# Patient Record
(demographics unavailable — no encounter records)

---

## 2025-03-07 NOTE — PHYSICAL EXAM
[Alert] : alert [Acute Distress] : no acute distress [Normocephalic] : normocephalic [Flat Open Anterior [place]] : flat open anterior fontanelle [Icteric sclera] : nonicteric sclera [PERRL] : PERRL [Red Reflex Bilateral] : red reflex bilateral [Normally Placed Ears] : normally placed ears [Auricles Well Formed] : auricles well formed [Discharge] : no discharge [Nares Patent] : nares patent [Palate Intact] : palate intact [Uvula Midline] : uvula midline [Supple, full passive range of motion] : supple, full passive range of motion [Palpable Masses] : no palpable masses [Symmetric Chest Rise] : symmetric chest rise [Clear to Auscultation Bilaterally] : clear to auscultation bilaterally [Regular Rate and Rhythm] : regular rate and rhythm [S1, S2 present] : S1, S2 present [Murmurs] : no murmurs [+2 Femoral Pulses] : +2 femoral pulses [Soft] : soft [Tender] : nontender [Distended] : not distended [Bowel Sounds] : bowel sounds present [Umbilical Stump Dry, Clean, Intact] : umbilical stump dry, clean, intact [Hepatomegaly] : no hepatomegaly [Splenomegaly] : no splenomegaly [Normal external genitalia] : normal external genitalia [Clitoromegaly] : no clitoromegaly [Patent Vagina] : patent vagina [Patent] : patent [Normally Placed] : normally placed [No Abnormal Lymph Nodes Palpated] : no abnormal lymph nodes palpated [Chowdhury-Ortolani] : negative Chowdhury-Ortolani [Symmetric Flexed Extremities] : symmetric flexed extremities [Spinal Dimple] : no spinal dimple [Tuft of Hair] : no tuft of hair [Startle Reflex] : startle reflex present [Suck Reflex] : suck reflex present [Rooting] : rooting reflex present [Palmar Grasp] : palmar grasp present [Plantar Grasp] : plantar reflex present [Symmetric Live] : symmetric [place] [Jaundice] : not jaundice [Dermal Melanocytosis] : Dermal Melanocytosis [de-identified] : 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

## 2025-03-07 NOTE — DISCUSSION/SUMMARY
[ Transition] :  transition [ Care] :  care [Nutritional Adequacy] : nutritional adequacy [Parental Well-Being] : parental well-being [Safety] : safety [Hepatitis B In Hospital] : Hepatitis B not administered while in the hospital [Parental Concerns Addressed] : Parental concerns addressed [] : The components of the vaccine(s) to be administered today are listed in the plan of care. The disease(s) for which the vaccine(s) are intended to prevent and the risks have been discussed with the caretaker.  The risks are also included in the appropriate vaccination information statements which have been provided to the patient's caregiver.  The caregiver has given consent to vaccinate. [FreeTextEntry1] : 3 day old  here for initial well visit. Feeding breast and formula. Overall doing well. Unremarkable pregnancy and nursery course.  Jaundice mild with TcB of 9.2. Rate of rise 0.1 mg/dL/hr, and phototherapy threshold for danika+ is 17.1. Gained weight since discharge.   - continue ad marielena feeds, encouraged breast feeding. Start vitamin D. - continue monitoring elimination, return for <4 voids per 24hrs for concern for dehydration - return for stools that are hard or colored gray, black or red - continue safe sleep practice, encourage separate sleeping space and back -to-sleep - increase tummy time to few times per day when awake, once umbilical cord is off - Hepatitis B vaccine given today. Declines beyfortus.    - RTC within one week for weight and bilirubin check. Educated on signs/symptoms of dehydration and hyperbilirubinemia, to return sooner for any concerns.

## 2025-03-07 NOTE — HISTORY OF PRESENT ILLNESS
[In Bassinet/Crib] : sleeps in bassinet/crib [Born at ___ Wks Gestation] : The patient was born at [unfilled] weeks gestation [C/S] : via  section [C/S Indication: ____] : ( [unfilled] ) [[facility]] : at [facility] [(1) _____] : [unfilled] [(5) _____] : [unfilled] [BW: _____] : weight of [unfilled] [Length: _____] : length of [unfilled] [DW: _____] : Discharge weight was [unfilled] [Age: ___] : [unfilled] year old mother [G: ___] : G [unfilled] [P: ___] : P [unfilled] [Rubella (Immune)] : Rubella immune [MBT: ____] : MBT - [unfilled] [] : positive [RSV vaccine] : RSV vaccine not received by mother at least 14 days prior to delivery [HepBsAG] : HepBsAg negative [HIV] : HIV negative [HepC] : Hepatitis C negative [GBS] : GBS negative [VDRL/RPR (Reactive)] : VDRL/RPR nonreactive [TcB: _____] : Transcutaneous Bilirubin [unfilled] mg/dL [Yes] : Yes [FreeTextEntry9] : B+ [de-identified] : 51 [Breast milk] : breast milk [Formula ___ oz/feed] : [unfilled] oz of formula per feed [Normal] : Normal [Hepatitis B Vaccine Given] : Hepatitis B vaccine not given [Nirsevimab Given] : Nirsevimab not given

## 2025-03-07 NOTE — PHYSICAL EXAM
[Alert] : alert [Acute Distress] : no acute distress [Normocephalic] : normocephalic [Flat Open Anterior [place]] : flat open anterior fontanelle [Icteric sclera] : nonicteric sclera [PERRL] : PERRL [Red Reflex Bilateral] : red reflex bilateral [Normally Placed Ears] : normally placed ears [Auricles Well Formed] : auricles well formed [Discharge] : no discharge [Nares Patent] : nares patent [Palate Intact] : palate intact [Uvula Midline] : uvula midline [Supple, full passive range of motion] : supple, full passive range of motion [Palpable Masses] : no palpable masses [Symmetric Chest Rise] : symmetric chest rise [Clear to Auscultation Bilaterally] : clear to auscultation bilaterally [Regular Rate and Rhythm] : regular rate and rhythm [S1, S2 present] : S1, S2 present [Murmurs] : no murmurs [+2 Femoral Pulses] : +2 femoral pulses [Soft] : soft [Tender] : nontender [Distended] : not distended [Bowel Sounds] : bowel sounds present [Umbilical Stump Dry, Clean, Intact] : umbilical stump dry, clean, intact [Hepatomegaly] : no hepatomegaly [Splenomegaly] : no splenomegaly [Normal external genitalia] : normal external genitalia [Clitoromegaly] : no clitoromegaly [Patent Vagina] : patent vagina [Patent] : patent [Normally Placed] : normally placed [No Abnormal Lymph Nodes Palpated] : no abnormal lymph nodes palpated [Chowdhury-Ortolani] : negative Chowdhury-Ortolani [Symmetric Flexed Extremities] : symmetric flexed extremities [Spinal Dimple] : no spinal dimple [Tuft of Hair] : no tuft of hair [Startle Reflex] : startle reflex present [Suck Reflex] : suck reflex present [Rooting] : rooting reflex present [Palmar Grasp] : palmar grasp present [Plantar Grasp] : plantar reflex present [Symmetric Live] : symmetric [place] [Jaundice] : not jaundice [Dermal Melanocytosis] : Dermal Melanocytosis [de-identified] : 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

## 2025-03-13 NOTE — DISCUSSION/SUMMARY
[FreeTextEntry1] : 10 day old here for weight check. Back to BW, doing well.  Received abnl  screen results for possible hypothyroidism, TSH of 30.7. Provided mom with lab requisition to have thyroid labs drawn today so we can follow up as necessary. Will call w results.  Next visit at 1 mo [facility].

## 2025-03-13 NOTE — HISTORY OF PRESENT ILLNESS
[de-identified] : Weight check [FreeTextEntry6] : Feeding well breast and bottle many wet and dirty diapers

## 2025-04-03 NOTE — DISCUSSION/SUMMARY
[Parental Well-Being] : parental well-being [Family Adjustment] : family adjustment [Feeding Routines] : feeding routines [Infant Adjustment] : infant adjustment [Safety] : safety [Parental Concerns Addressed] : Parental concerns addressed [] : The components of the vaccine(s) to be administered today are listed in the plan of care. The disease(s) for which the vaccine(s) are intended to prevent and the risks have been discussed with the caretaker.  The risks are also included in the appropriate vaccination information statements which have been provided to the patient's caregiver.  The caregiver has given consent to vaccinate. [FreeTextEntry1] : 1 month old baby here doing well. Gaining weight nicely. No parental concerns today.  Murmur heard today, referred to cardio. Discharge left eye likely NDO but will Rx erythro in case of developing conjunctivitis.   - continue ad marielena feeds, return for feeding intolerance - continue safe sleep practice including back to sleep - continue Vitamin D - encouraged tummy time to improve head control - Received Hep B #2 today - RTC 1mo for routine 2mo WCC

## 2025-04-03 NOTE — HISTORY OF PRESENT ILLNESS
[Mother] : mother [Formula ___ oz/feed] : [unfilled] oz of formula per feed [Hours between feeds ___] : Child is fed every [unfilled] hours [Normal] : Normal [In Bassinet/Crib] : sleeps in bassinet/crib [No] : No cigarette smoke exposure [Rear facing car seat in back seat] : Rear facing car seat in back seat

## 2025-04-03 NOTE — PHYSICAL EXAM
[Alert] : alert [Acute Distress] : no acute distress [Normocephalic] : normocephalic [Flat Open Anterior [place]] : flat open anterior fontanelle [PERRL] : PERRL [Red Reflex Bilateral] : red reflex bilateral [Normally Placed Ears] : normally placed ears [Auricles Well Formed] : auricles well formed [Discharge] : no discharge [Nares Patent] : nares patent [Palate Intact] : palate intact [Uvula Midline] : uvula midline [Supple, full passive range of motion] : supple, full passive range of motion [Palpable Masses] : no palpable masses [Symmetric Chest Rise] : symmetric chest rise [Clear to Auscultation Bilaterally] : clear to auscultation bilaterally [Regular Rate and Rhythm] : regular rate and rhythm [S1, S2 present] : S1, S2 present [Murmurs] : murmurs [+2 Femoral Pulses] : +2 femoral pulses [Soft] : soft [Tender] : nontender [Distended] : not distended [Bowel Sounds] : bowel sounds present [Hepatomegaly] : no hepatomegaly [Splenomegaly] : no splenomegaly [Normal external genitailia] : normal external genitalia [Clitoromegaly] : no clitoromegaly [Patent Vagina] : vagina patent [Normally Placed] : normally placed [No Abnormal Lymph Nodes Palpated] : no abnormal lymph nodes palpated [Chowdhury-Ortolani] : negative Chowdhury-Ortolani [Symmetric Flexed Extremities] : symmetric flexed extremities [Spinal Dimple] : no spinal dimple [Tuft of Hair] : no tuft of hair [Startle Reflex] : startle reflex present [Suck Reflex] : suck reflex present [Rooting] : rooting reflex present [Palmar Grasp] : palmar grasp reflex present [Plantar Grasp] : plantar grasp reflex present [Symmetric Live] : symmetric [place] [Jaundice] : no jaundice [Rash and/or lesion present] : no rash/lesion [FreeTextEntry5] : discharge L eye